# Patient Record
Sex: MALE | Race: WHITE | NOT HISPANIC OR LATINO | Employment: FULL TIME | URBAN - METROPOLITAN AREA
[De-identification: names, ages, dates, MRNs, and addresses within clinical notes are randomized per-mention and may not be internally consistent; named-entity substitution may affect disease eponyms.]

---

## 2017-10-04 ENCOUNTER — HOSPITAL ENCOUNTER (OUTPATIENT)
Dept: RADIOLOGY | Facility: HOSPITAL | Age: 57
Discharge: HOME | End: 2017-10-04
Payer: COMMERCIAL

## 2017-10-04 ENCOUNTER — TRANSCRIBE ORDERS (OUTPATIENT)
Dept: LAB | Facility: CLINIC | Age: 57
End: 2017-10-04

## 2017-10-04 ENCOUNTER — OFFICE VISIT (OUTPATIENT)
Dept: LAB | Facility: CLINIC | Age: 57
End: 2017-10-04
Payer: COMMERCIAL

## 2017-10-04 DIAGNOSIS — R05.9 COUGH: ICD-10-CM

## 2017-10-04 DIAGNOSIS — R07.89 CHEST DISCOMFORT: ICD-10-CM

## 2017-10-04 DIAGNOSIS — R07.89 CHEST DISCOMFORT: Primary | ICD-10-CM

## 2017-10-04 LAB
ATRIAL RATE: 59 BPM
P AXIS: 73 DEGREES
PR INTERVAL: 146 MS
QRS AXIS: 84 DEGREES
QRSD INTERVAL: 90 MS
QT INTERVAL: 408 MS
QTC INTERVAL: 403 MS
T WAVE AXIS: 61 DEGREES
VENTRICULAR RATE: 59 BPM

## 2017-10-04 PROCEDURE — 71020 HB CHEST X-RAY 2VW FRONTAL&LATL: CPT

## 2017-10-04 PROCEDURE — 93005 ELECTROCARDIOGRAM TRACING: CPT

## 2017-10-20 ENCOUNTER — TRANSCRIBE ORDERS (OUTPATIENT)
Dept: ADMINISTRATIVE | Facility: HOSPITAL | Age: 57
End: 2017-10-20

## 2017-10-20 DIAGNOSIS — R93.89 ABNORMAL CHEST X-RAY: Primary | ICD-10-CM

## 2017-10-26 ENCOUNTER — HOSPITAL ENCOUNTER (OUTPATIENT)
Dept: CT IMAGING | Facility: HOSPITAL | Age: 57
Discharge: HOME | End: 2017-10-26
Payer: COMMERCIAL

## 2017-10-26 DIAGNOSIS — R93.89 ABNORMAL CHEST X-RAY: ICD-10-CM

## 2017-10-26 PROCEDURE — 71260 CT THORAX DX C+: CPT

## 2017-10-26 RX ADMIN — IOHEXOL 85 ML: 350 INJECTION, SOLUTION INTRAVENOUS at 20:03

## 2019-04-25 ENCOUNTER — TELEPHONE (OUTPATIENT)
Dept: FAMILY MEDICINE CLINIC | Facility: CLINIC | Age: 59
End: 2019-04-25

## 2020-10-14 ENCOUNTER — OFFICE VISIT (OUTPATIENT)
Dept: INTERNAL MEDICINE CLINIC | Facility: CLINIC | Age: 60
End: 2020-10-14
Payer: COMMERCIAL

## 2020-10-14 VITALS
BODY MASS INDEX: 28.23 KG/M2 | HEART RATE: 72 BPM | TEMPERATURE: 98.1 F | OXYGEN SATURATION: 97 % | WEIGHT: 220 LBS | SYSTOLIC BLOOD PRESSURE: 108 MMHG | HEIGHT: 74 IN | DIASTOLIC BLOOD PRESSURE: 74 MMHG

## 2020-10-14 DIAGNOSIS — Z13.220 SCREENING FOR HYPERLIPIDEMIA: ICD-10-CM

## 2020-10-14 DIAGNOSIS — Z12.11 COLON CANCER SCREENING: ICD-10-CM

## 2020-10-14 DIAGNOSIS — Z87.448 HISTORY OF HEMATURIA: ICD-10-CM

## 2020-10-14 DIAGNOSIS — Z12.5 PROSTATE CANCER SCREENING: ICD-10-CM

## 2020-10-14 DIAGNOSIS — Z12.2 ENCOUNTER FOR SCREENING FOR LUNG CANCER: ICD-10-CM

## 2020-10-14 DIAGNOSIS — Z86.010 HISTORY OF COLON POLYPS: ICD-10-CM

## 2020-10-14 DIAGNOSIS — L98.9 SKIN LESION OF NECK: ICD-10-CM

## 2020-10-14 DIAGNOSIS — Z00.01 ENCOUNTER FOR GENERAL ADULT MEDICAL EXAMINATION WITH ABNORMAL FINDINGS: Primary | ICD-10-CM

## 2020-10-14 PROBLEM — Z86.0100 HISTORY OF COLON POLYPS: Status: ACTIVE | Noted: 2020-10-14

## 2020-10-14 PROCEDURE — 99386 PREV VISIT NEW AGE 40-64: CPT | Performed by: INTERNAL MEDICINE

## 2020-10-14 PROCEDURE — 3725F SCREEN DEPRESSION PERFORMED: CPT | Performed by: INTERNAL MEDICINE

## 2020-10-14 PROCEDURE — 1036F TOBACCO NON-USER: CPT | Performed by: INTERNAL MEDICINE

## 2020-11-04 ENCOUNTER — TELEPHONE (OUTPATIENT)
Dept: INTERNAL MEDICINE CLINIC | Facility: CLINIC | Age: 60
End: 2020-11-04

## 2020-11-17 ENCOUNTER — TELEPHONE (OUTPATIENT)
Dept: INTERNAL MEDICINE CLINIC | Facility: CLINIC | Age: 60
End: 2020-11-17

## 2020-11-18 DIAGNOSIS — N52.9 ERECTILE DYSFUNCTION, UNSPECIFIED ERECTILE DYSFUNCTION TYPE: Primary | ICD-10-CM

## 2020-11-18 RX ORDER — SILDENAFIL 25 MG/1
25 TABLET, FILM COATED ORAL DAILY PRN
Qty: 10 TABLET | Refills: 2 | Status: SHIPPED | OUTPATIENT
Start: 2020-11-18 | End: 2021-12-22 | Stop reason: DRUGHIGH

## 2020-11-30 ENCOUNTER — OFFICE VISIT (OUTPATIENT)
Dept: DERMATOLOGY | Facility: CLINIC | Age: 60
End: 2020-11-30
Payer: COMMERCIAL

## 2020-11-30 DIAGNOSIS — D48.5 NEOPLASM OF UNCERTAIN BEHAVIOR OF SKIN: Primary | ICD-10-CM

## 2020-11-30 PROCEDURE — 88305 TISSUE EXAM BY PATHOLOGIST: CPT | Performed by: STUDENT IN AN ORGANIZED HEALTH CARE EDUCATION/TRAINING PROGRAM

## 2020-11-30 PROCEDURE — 11102 TANGNTL BX SKIN SINGLE LES: CPT | Performed by: DERMATOLOGY

## 2020-11-30 PROCEDURE — 99202 OFFICE O/P NEW SF 15 MIN: CPT | Performed by: DERMATOLOGY

## 2020-12-01 DIAGNOSIS — N52.9 ERECTILE DYSFUNCTION, UNSPECIFIED ERECTILE DYSFUNCTION TYPE: Primary | ICD-10-CM

## 2020-12-01 RX ORDER — SILDENAFIL 50 MG/1
50 TABLET, FILM COATED ORAL DAILY PRN
Qty: 10 TABLET | Refills: 0 | Status: SHIPPED | OUTPATIENT
Start: 2020-12-01 | End: 2021-12-22 | Stop reason: SDUPTHER

## 2020-12-26 ENCOUNTER — PREP FOR PROCEDURE (OUTPATIENT)
Dept: GASTROENTEROLOGY | Facility: MEDICAL CENTER | Age: 60
End: 2020-12-26

## 2020-12-26 DIAGNOSIS — Z86.010 HX OF COLONIC POLYPS: Primary | ICD-10-CM

## 2021-05-19 ENCOUNTER — TELEPHONE (OUTPATIENT)
Dept: GASTROENTEROLOGY | Facility: CLINIC | Age: 61
End: 2021-05-19

## 2021-05-19 NOTE — TELEPHONE ENCOUNTER
Pt is due for recall colon for hx of polyps with Dr Manoj Chopra   Pt was scheduled, however, cxd due to wanting post covid  I called and spoke to pt whom informed he is still not ready and will call us when is to schedule, possibly summer  comfortable appearance/cooperative/smiling/interactive Recommended modifications/Nutrition relationship to health/disease/Provided nutritional counseling/educational material

## 2021-12-22 DIAGNOSIS — N52.9 ERECTILE DYSFUNCTION, UNSPECIFIED ERECTILE DYSFUNCTION TYPE: ICD-10-CM

## 2021-12-22 RX ORDER — SILDENAFIL 50 MG/1
50 TABLET, FILM COATED ORAL DAILY PRN
Qty: 10 TABLET | Refills: 0 | Status: SHIPPED | OUTPATIENT
Start: 2021-12-22 | End: 2022-06-27 | Stop reason: SDUPTHER

## 2022-06-14 ENCOUNTER — VBI (OUTPATIENT)
Dept: ADMINISTRATIVE | Facility: OTHER | Age: 62
End: 2022-06-14

## 2022-06-17 DIAGNOSIS — N52.9 ERECTILE DYSFUNCTION, UNSPECIFIED ERECTILE DYSFUNCTION TYPE: ICD-10-CM

## 2022-06-17 RX ORDER — SILDENAFIL 50 MG/1
50 TABLET, FILM COATED ORAL DAILY PRN
Qty: 10 TABLET | Refills: 0 | OUTPATIENT
Start: 2022-06-17

## 2022-06-17 NOTE — TELEPHONE ENCOUNTER
Sona De Paz has called the [facility] office requesting a refill of sildenafil (VIAGRA) 50 mg tablet  Pt requested refill to be sent to the [facility] on [address]

## 2022-06-27 ENCOUNTER — OFFICE VISIT (OUTPATIENT)
Dept: INTERNAL MEDICINE CLINIC | Facility: CLINIC | Age: 62
End: 2022-06-27
Payer: COMMERCIAL

## 2022-06-27 VITALS
WEIGHT: 223.4 LBS | OXYGEN SATURATION: 98 % | TEMPERATURE: 97.8 F | SYSTOLIC BLOOD PRESSURE: 124 MMHG | HEART RATE: 61 BPM | BODY MASS INDEX: 28.67 KG/M2 | DIASTOLIC BLOOD PRESSURE: 80 MMHG | HEIGHT: 74 IN

## 2022-06-27 DIAGNOSIS — Z00.00 ANNUAL PHYSICAL EXAM: Primary | ICD-10-CM

## 2022-06-27 DIAGNOSIS — Z13.6 SCREENING FOR CARDIOVASCULAR CONDITION: ICD-10-CM

## 2022-06-27 DIAGNOSIS — Z13.1 SCREENING FOR DIABETES MELLITUS: ICD-10-CM

## 2022-06-27 DIAGNOSIS — N52.9 ERECTILE DYSFUNCTION, UNSPECIFIED ERECTILE DYSFUNCTION TYPE: ICD-10-CM

## 2022-06-27 DIAGNOSIS — Z86.010 HISTORY OF COLON POLYPS: ICD-10-CM

## 2022-06-27 DIAGNOSIS — Z12.2 SCREENING FOR LUNG CANCER: ICD-10-CM

## 2022-06-27 DIAGNOSIS — Z11.59 NEED FOR HEPATITIS C SCREENING TEST: ICD-10-CM

## 2022-06-27 DIAGNOSIS — Z11.4 SCREENING FOR HIV (HUMAN IMMUNODEFICIENCY VIRUS): ICD-10-CM

## 2022-06-27 PROCEDURE — 3008F BODY MASS INDEX DOCD: CPT | Performed by: INTERNAL MEDICINE

## 2022-06-27 PROCEDURE — 3725F SCREEN DEPRESSION PERFORMED: CPT | Performed by: INTERNAL MEDICINE

## 2022-06-27 PROCEDURE — 1036F TOBACCO NON-USER: CPT | Performed by: INTERNAL MEDICINE

## 2022-06-27 PROCEDURE — 99396 PREV VISIT EST AGE 40-64: CPT | Performed by: INTERNAL MEDICINE

## 2022-06-27 RX ORDER — SILDENAFIL 50 MG/1
50 TABLET, FILM COATED ORAL DAILY PRN
Qty: 10 TABLET | Refills: 0 | Status: SHIPPED | OUTPATIENT
Start: 2022-06-27 | End: 2022-06-27

## 2022-06-27 RX ORDER — SILDENAFIL 50 MG/1
100 TABLET, FILM COATED ORAL DAILY PRN
Qty: 10 TABLET | Refills: 0 | Status: SHIPPED | OUTPATIENT
Start: 2022-06-27

## 2022-06-27 NOTE — PROGRESS NOTES
[facility] INTERNAL MEDICINE [place]    NAME: Dennis Wills  AGE: 63 y o  SEX: male  : 1960     DATE: 2022     Assessment and Plan:     Problem List Items Addressed This Visit    None     Visit Diagnoses     Annual physical exam    -  Primary    Screening for diabetes mellitus        Relevant Orders    Hemoglobin A1C    Screening for cardiovascular condition        Relevant Orders    Lipid panel    BMI 28 0-28 9,adult        Relevant Orders    Comprehensive metabolic panel    CBC and Platelet    TSH, 3rd generation with Free T4 reflex          Immunizations and preventive care screenings were discussed with patient today  Appropriate education was printed on patient's after visit summary  Counseling:  Alcohol/drug use: discussed moderation in alcohol intake, the recommendations for healthy alcohol use, and avoidance of illicit drug use  Dental Health: discussed importance of regular tooth brushing, flossing, and dental visits  Injury prevention: discussed safety/seat belts, safety helmets, smoke detectors, carbon dioxide detectors, and smoking near bedding or upholstery  Sexual health: discussed sexually transmitted diseases, partner selection, use of condoms, avoidance of unintended pregnancy, and contraceptive alternatives  · Exercise: the importance of regular exercise/physical activity was discussed  Recommend exercise 3-5 times per week for at least 30 minutes  Depression Screening and Follow-up Plan: Patient was screened for depression during today's encounter  They screened negative with a PHQ-2 score of 0  No follow-ups on file  Chief Complaint:     Chief Complaint   Patient presents with    Annual Exam      History of Present Illness:     Adult Annual Physical   Patient here for a comprehensive physical exam  The patient reports no problems    Last visit done in 2020 for annual physical, since then patient is doing well  Due to the pandemic he did not follow-up with CT lung screening or colonoscopy for history of polyps with his GI doctor, he is now willing to have the parent he also due for complete blood work parent he did have a lesion on his neck biopsy by dermatologist, with pathology revealing benign tissue  Today he has no subjective complaint  He does wish to increase his dose of sildenafil  His last urine and PSA was normal     Diet and Physical Activity  · Diet/Nutrition: well balanced diet, consuming 3-5 servings of fruits/vegetables daily and adequate whole grain intake  · Exercise: walking, moderate cardiovascular exercise and 3-4 times a week on average  Depression Screening  PHQ-2/9 Depression Screening    Little interest or pleasure in doing things: 0 - not at all  Feeling down, depressed, or hopeless: 0 - not at all  PHQ-2 Score: 0  PHQ-2 Interpretation: Negative depression screen       General Health  · Sleep: sleeps well, gets 7-8 hours of sleep on average and snores loudly  · Hearing: normal - bilateral   · Vision: wears glasses  · Dental: regular dental visits   Health  · Symptoms include: erectile dysfunction     Review of Systems:     Review of Systems   Constitutional: Negative for appetite change and fatigue  HENT: Negative for congestion and sore throat  Eyes: Negative for visual disturbance  Respiratory: Negative for cough, chest tightness, shortness of breath and wheezing  Cardiovascular: Negative for chest pain, palpitations and leg swelling  Gastrointestinal: Negative for abdominal pain, nausea and vomiting  Genitourinary: Negative for difficulty urinating, frequency, hematuria and urgency  Musculoskeletal: Negative for arthralgias and joint swelling  Skin: Negative for rash  Neurological: Negative for dizziness and headaches  Psychiatric/Behavioral: Negative for confusion and sleep disturbance        Past Medical History:     History reviewed  No pertinent past medical history  Past Surgical History:     Past Surgical History:   Procedure Laterality Date    TONSILECTOMY AND ADNOIDECTOMY        Family History:     Family History   Adopted: Yes      Social History:     Social History     Socioeconomic History    Marital status: /Civil Union     Spouse name: None    Number of children: None    Years of education: None    Highest education level: None   Occupational History    None   Tobacco Use    Smoking status: Former Smoker     Packs/day: 1 00     Years: 32 00     Pack years: 32 00     Quit date: 2020     Years since quittin 9    Smokeless tobacco: Never Used   Vaping Use    Vaping Use: Never used   Substance and Sexual Activity    Alcohol use: Not Currently    Drug use: Yes     Types: Marijuana     Comment: teenager    Sexual activity: None   Other Topics Concern    None   Social History Narrative    None     Social Determinants of Health     Financial Resource Strain: Not on file   Food Insecurity: Not on file   Transportation Needs: Not on file   Physical Activity: Not on file   Stress: Not on file   Social Connections: Not on file   Intimate Partner Violence: Not on file   Housing Stability: Not on file      Current Medications:     Current Outpatient Medications   Medication Sig Dispense Refill    sildenafil (VIAGRA) 50 MG tablet Take 1 tablet (50 mg total) by mouth daily as needed for erectile dysfunction (Patient not taking: Reported on 2022) 10 tablet 0     No current facility-administered medications for this visit  Allergies:     No Known Allergies   Physical Exam:     /80 (BP Location: Left arm, Patient Position: Sitting, Cuff Size: Standard)   Pulse 61   Temp 97 8 °F (36 6 °C) (Tympanic)   Ht 6' 2" (1 88 m)   Wt 101 kg (223 lb 6 4 oz)   SpO2 98%   BMI 28 68 kg/m²     Physical Exam  Vitals and nursing note reviewed  Constitutional:       General: He is not in acute distress  Appearance: Normal appearance  HENT:      Head: Normocephalic and atraumatic  Right Ear: Tympanic membrane and external ear normal       Left Ear: Tympanic membrane and external ear normal    Eyes:      Extraocular Movements: Extraocular movements intact  Pupils: Pupils are equal, round, and reactive to light  Cardiovascular:      Rate and Rhythm: Normal rate and regular rhythm  Pulses: Normal pulses  Heart sounds: No murmur heard  Pulmonary:      Effort: Pulmonary effort is normal       Breath sounds: Normal breath sounds  No wheezing or rhonchi  Abdominal:      General: Abdomen is flat  Bowel sounds are normal       Palpations: Abdomen is soft  Tenderness: There is no abdominal tenderness  Musculoskeletal:         General: No swelling  Normal range of motion  Cervical back: Normal range of motion and neck supple  Skin:     General: Skin is warm and dry  Capillary Refill: Capillary refill takes less than 2 seconds  Findings: No lesion or rash  Neurological:      General: No focal deficit present  Mental Status: He is alert and oriented to person, place, and time  Sensory: No sensory deficit  Motor: No weakness  Psychiatric:         Mood and Affect: Mood normal          Behavior: Behavior normal          Thought Content: Thought content normal          Judgment: Judgment normal           Bandar Izaguirre MD  [facility] INTERNAL MEDICINE [place]       BMI Counseling: Body mass index is 28 68 kg/m²  The BMI is above normal  Nutrition recommendations include moderation in carbohydrate intake  Exercise recommendations include exercising 3-5 times per week

## 2022-06-27 NOTE — PATIENT INSTRUCTIONS
Wellness Visit for Adults   AMBULATORY CARE:   A wellness visit  is when you see your healthcare provider to get screened for health problems  Your healthcare provider will also give you advice on how to stay healthy  Write down your questions so you remember to ask them  Ask your healthcare provider how often you should have a wellness visit  What happens at a wellness visit:  Your healthcare provider will ask about your health, and your family history of health problems  This includes high blood pressure, heart disease, and cancer  He or she will ask if you have symptoms that concern you, if you smoke, and about your mood  You may also be asked about your intake of medicines, supplements, food, and alcohol  Any of the following may be done:  · Your weight  will be checked  Your height may also be checked so your body mass index (BMI) can be calculated  Your BMI shows if you are at a healthy weight  · Your blood pressure  and heart rate will be checked  Your temperature may also be checked  · Blood and urine tests  may be done  Blood tests may be done to check your cholesterol levels  Abnormal cholesterol levels increase your risk for heart disease and stroke  You may also need a blood or urine test to check for diabetes if you are at increased risk  Urine tests may be done to look for signs of an infection or kidney disease  · A physical exam  includes checking your heartbeat and lungs with a stethoscope  Your healthcare provider may also check your skin to look for sun damage  · Screening tests  may be recommended  A screening test is done to check for diseases that may not cause symptoms  The screening tests you may need depend on your age, gender, family history, and lifestyle habits  For example, colorectal screening may be recommended if you are 48years old or older  Screening tests you need if you are a woman:   · A Pap smear  is used to screen for cervical cancer   Pap smears are usually done every 3 to 5 years depending on your age  You may need them more often if you have had abnormal Pap smear test results in the past  Ask your healthcare provider how often you should have a Pap smear  · A mammogram  is an x-ray of your breasts to screen for breast cancer  Experts recommend mammograms every 2 years starting at age 48 years  You may need a mammogram at age 52 years or younger if you have an increased risk for breast cancer  Talk to your healthcare provider about when you should start having mammograms and how often you need them  Vaccines you may need:   · Get an influenza vaccine  every year  The influenza vaccine protects you from the flu  Several types of viruses cause the flu  The viruses change over time, so new vaccines are made each year  · Get a tetanus-diphtheria (Td) booster vaccine  every 10 years  This vaccine protects you against tetanus and diphtheria  Tetanus is a severe infection that may cause painful muscle spasms and lockjaw  Diphtheria is a severe bacterial infection that causes a thick covering in the back of your mouth and throat  · Get a human papillomavirus (HPV) vaccine  if you are female and aged 23 to 32 or male 23 to 24 and never received it  This vaccine protects you from HPV infection  HPV is the most common infection spread by sexual contact  HPV may also cause vaginal, penile, and anal cancers  · Get a pneumococcal vaccine  if you are aged 72 years or older  The pneumococcal vaccine is an injection given to protect you from pneumococcal disease  Pneumococcal disease is an infection caused by pneumococcal bacteria  The infection may cause pneumonia, meningitis, or an ear infection  · Get a shingles vaccine  if you are 60 or older, even if you have had shingles before  The shingles vaccine is an injection to protect you from the varicella-zoster virus  This is the same virus that causes chickenpox   Shingles is a painful rash that develops in people who had chickenpox or have been exposed to the virus  How to eat healthy:  My Plate is a model for planning healthy meals  It shows the types and amounts of foods that should go on your plate  Fruits and vegetables make up about half of your plate, and grains and protein make up the other half  A serving of dairy is included on the side of your plate  The amount of calories and serving sizes you need depends on your age, gender, weight, and height  Examples of healthy foods are listed below:  · Eat a variety of vegetables  such as dark green, red, and orange vegetables  You can also include canned vegetables low in sodium (salt) and frozen vegetables without added butter or sauces  · Eat a variety of fresh fruits , canned fruit in 100% juice, frozen fruit, and dried fruit  · Include whole grains  At least half of the grains you eat should be whole grains  Examples include whole-wheat bread, wheat pasta, brown rice, and whole-grain cereals such as oatmeal     · Eat a variety of protein foods such as seafood (fish and shellfish), lean meat, and poultry without skin (turkey and chicken)  Examples of lean meats include pork leg, shoulder, or tenderloin, and beef round, sirloin, tenderloin, and extra lean ground beef  Other protein foods include eggs and egg substitutes, beans, peas, soy products, nuts, and seeds  · Choose low-fat dairy products such as skim or 1% milk or low-fat yogurt, cheese, and cottage cheese  · Limit unhealthy fats  such as butter, hard margarine, and shortening  Exercise:  Exercise at least 30 minutes per day on most days of the week  Some examples of exercise include walking, biking, dancing, and swimming  You can also fit in more physical activity by taking the stairs instead of the elevator or parking farther away from stores  Include muscle strengthening activities 2 days each week  Regular exercise provides many health benefits   It helps you manage your weight, and decreases your risk for type 2 diabetes, heart disease, stroke, and high blood pressure  Exercise can also help improve your mood  Ask your healthcare provider about the best exercise plan for you  General health and safety guidelines:   · Do not smoke  Nicotine and other chemicals in cigarettes and cigars can cause lung damage  Ask your healthcare provider for information if you currently smoke and need help to quit  E-cigarettes or smokeless tobacco still contain nicotine  Talk to your healthcare provider before you use these products  · Limit alcohol  A drink of alcohol is 12 ounces of beer, 5 ounces of wine, or 1½ ounces of liquor  · Lose weight, if needed  Being overweight increases your risk of certain health conditions  These include heart disease, high blood pressure, type 2 diabetes, and certain types of cancer  · Protect your skin  Do not sunbathe or use tanning beds  Use sunscreen with a SPF 15 or higher  Apply sunscreen at least 15 minutes before you go outside  Reapply sunscreen every 2 hours  Wear protective clothing, hats, and sunglasses when you are outside  · Drive safely  Always wear your seatbelt  Make sure everyone in your car wears a seatbelt  A seatbelt can save your life if you are in an accident  Do not use your cell phone when you are driving  This could distract you and cause an accident  Pull over if you need to make a call or send a text message  · Practice safe sex  Use latex condoms if are sexually active and have more than one partner  Your healthcare provider may recommend screening tests for sexually transmitted infections (STIs)  · Wear helmets, lifejackets, and protective gear  Always wear a helmet when you ride a bike or motorcycle, go skiing, or play sports that could cause a head injury  Wear protective equipment when you play sports  Wear a lifejacket when you are on a boat or doing water sports      © Copyright [employer] 2022 Information is for End User's use only and may not be sold, redistributed or otherwise used for commercial purposes  All illustrations and images included in CareNotes® are the copyrighted property of A D A M [employer] Inc  or [employer] Alberto Gambino  The above information is an  only  It is not intended as medical advice for individual conditions or treatments  Talk to your doctor, nurse or pharmacist before following any medical regimen to see if it is safe and effective for you  Low Fat Diet   AMBULATORY CARE:   A low-fat diet  is an eating plan that is low in total fat, unhealthy fat, and cholesterol  You may need to follow a low-fat diet if you have trouble digesting or absorbing fat  You may also need to follow this diet if you have high cholesterol  You can also lower your cholesterol by increasing the amount of fiber in your diet  Soluble fiber is a type of fiber that helps to decrease cholesterol levels  Different types of fat in food:   · Limit unhealthy fats  A diet that is high in cholesterol, saturated fat, and trans fat may cause unhealthy cholesterol levels  Unhealthy cholesterol levels increase your risk of heart disease  ? Cholesterol:  Limit intake of cholesterol to less than 200 mg per day  Cholesterol is found in meat, eggs, and dairy  ? Saturated fat:  Limit saturated fat to less than 7% of your total daily calories  Ask your dietitian how many calories you need each day  Saturated fat is found in butter, cheese, ice cream, whole milk, and palm oil  Saturated fat is also found in meat, such as beef, pork, chicken skin, and processed meats  Processed meats include sausage, hot dogs, and bologna  ? Trans fat:  Avoid trans fat as much as possible  Trans fat is used in fried and baked foods  Foods that say trans fat free on the label may still have up to 0 5 grams of trans fat per serving  · Include healthy fats    Replace foods that are high in saturated and trans fat with foods high in healthy fats  This may help to decrease high cholesterol levels  ? Monounsaturated fats: These are found in avocados, nuts, and vegetable oils, such as olive, canola, and sunflower oil  ? Polyunsaturated fats: These can be found in vegetable oils, such as soybean or corn oil  Omega-3 fats can help to decrease the risk of heart disease  Omega-3 fats are found in fish, such as salmon, herring, trout, and tuna  Omega-3 fats can also be found in plant foods, such as walnuts, flaxseed, soybeans, and canola oil  Foods to limit or avoid:   · Grains:      ? Snacks that are made with partially hydrogenated oils, such as chips, regular crackers, and butter-flavored popcorn    ? High-fat baked goods, such as biscuits, croissants, doughnuts, pies, cookies, and pastries    · Dairy:      ? Whole milk, 2% milk, and yogurt and ice cream made with whole milk    ? Half and half creamer, heavy cream, and whipping cream    ? Cheese, cream cheese, and sour cream    · Meats and proteins:      ? High-fat cuts of meat (T-bone steak, regular hamburger, and ribs)    ? Fried meat, poultry (turkey and chicken), and fish    ? Poultry (chicken and turkey) with skin    ? Cold cuts (salami or bologna), hot dogs, hernadez, and sausage    ? Whole eggs and egg yolks    · Vegetables and fruits with added fat:      ? Fried vegetables or vegetables in butter or high-fat sauces, such as cream or cheese sauces    ? Fried fruit or fruit served with butter or cream    · Fats:      ? Butter, stick margarine, and shortening    ? Coconut, palm oil, and palm kernel oil    Foods to include:   · Grains:      ? Whole-grain breads, cereals, pasta, and brown rice    ? Low-fat crackers and pretzels    · Vegetables and fruits:      ? Fresh, frozen, or canned vegetables (no salt or low-sodium)    ? Fresh, frozen, dried, or canned fruit (canned in light syrup or fruit juice)    ? Avocado    · Low-fat dairy products:      ? Nonfat (skim) or 1% milk    ?  Nonfat or low-fat cheese, yogurt, and cottage cheese    · Meats and proteins:      ? Chicken or turkey with no skin    ? Baked or broiled fish    ? Lean beef and pork (loin, round, extra lean hamburger)    ? Beans and peas, unsalted nuts, soy products    ? Egg whites and substitutes    ? Seeds and nuts    · Fats:      ? Unsaturated oil, such as canola, olive, peanut, soybean, or sunflower oil    ? Soft or liquid margarine and vegetable oil spread    ? Low-fat salad dressing    Other ways to decrease fat:   · Read food labels before you buy foods  Choose foods that have less than 30% of calories from fat  Choose low-fat or fat-free dairy products  Remember that fat free does not mean calorie free  These foods still contain calories, and too many calories can lead to weight gain  · Trim fat from meat and avoid fried food  Trim all visible fat from meat before you cook it  Remove the skin from poultry  Do not reyes meat, fish, or poultry  Bake, roast, boil, or broil these foods instead  Avoid fried foods  Eat a baked potato instead of [place] fries  Steam vegetables instead of sautéing them in butter  · Add less fat to foods  Use imitation hernadez bits on salads and baked potatoes instead of regular hernadez bits  Use fat-free or low-fat salad dressings instead of regular dressings  Use low-fat or nonfat butter-flavored topping instead of regular butter or margarine on popcorn and other foods  Ways to decrease fat in recipes:  Replace high-fat ingredients with low-fat or nonfat ones  This may cause baked goods to be drier than usual  You may need to use nonfat cooking spray on pans to prevent food from sticking  You also may need to change the amount of other ingredients, such as water, in the recipe  Try the following:  · Use low-fat or light margarine instead of regular margarine or shortening  · Use lean ground turkey breast or chicken, or lean ground beef (less than 5% fat) instead of hamburger       · Add 1 teaspoon of canola oil to 8 ounces of skim milk instead of using cream or half and half  · Use grated zucchini, carrots, or apples in breads instead of coconut  · Use blenderized, low-fat cottage cheese, plain tofu, or low-fat ricotta cheese instead of cream cheese  · Use 1 egg white and 1 teaspoon of canola oil, or use ¼ cup (2 ounces) of fat-free egg substitute instead of a whole egg  · Replace half of the oil that is called for in a recipe with applesauce when you bake  Use 3 tablespoons of cocoa powder and 1 tablespoon of canola oil instead of a square of baking chocolate  How to increase fiber:  Eat enough high-fiber foods to get 20 to 30 grams of fiber every day  Slowly increase your fiber intake to avoid stomach cramps, gas, and other problems  · Eat 3 ounces of whole-grain foods each day  An ounce is about 1 slice of bread  Eat whole-grain breads, such as whole-wheat bread  Whole wheat, whole-wheat flour, or other whole grains should be listed as the first ingredient on the food label  Replace white flour with whole-grain flour or use half of each in recipes  Whole-grain flour is heavier than white flour, so you may have to add more yeast or baking powder  · Eat a high-fiber cereal for breakfast   Oatmeal is a good source of soluble fiber  Look for cereals that have bran or fiber in the name  Choose whole-grain products, such as brown rice, barley, and whole-wheat pasta  · Eat more beans, peas, and lentils  For example, add beans to soups or salads  Eat at least 5 cups of fruits and vegetables each day  Eat fruits and vegetables with the peel because the peel is high in fiber  © Copyright [employer] 2022 Information is for End User's use only and may not be sold, redistributed or otherwise used for commercial purposes   All illustrations and images included in CareNotes® are the copyrighted property of [employer]  or [employer] Alberto Gambino  The above information is an  only  It is not intended as medical advice for individual conditions or treatments  Talk to your doctor, nurse or pharmacist before following any medical regimen to see if it is safe and effective for you

## 2022-06-29 LAB
EXTERNAL HIV SCREEN: NORMAL
HBA1C MFR BLD HPLC: 5.2 %
HCV AB SER-ACNC: NEGATIVE

## 2022-07-07 ENCOUNTER — TELEPHONE (OUTPATIENT)
Dept: GASTROENTEROLOGY | Facility: CLINIC | Age: 62
End: 2022-07-07

## 2022-07-07 NOTE — TELEPHONE ENCOUNTER
[facility] received referral from PCP  Please call to schedule an o/v & or Colonoscopy  Will follow up in 1 week

## 2022-09-16 ENCOUNTER — VBI (OUTPATIENT)
Dept: ADMINISTRATIVE | Facility: OTHER | Age: 62
End: 2022-09-16

## 2022-11-01 ENCOUNTER — OFFICE VISIT (OUTPATIENT)
Dept: INTERNAL MEDICINE CLINIC | Facility: CLINIC | Age: 62
End: 2022-11-01

## 2022-11-01 VITALS
OXYGEN SATURATION: 99 % | TEMPERATURE: 98.6 F | SYSTOLIC BLOOD PRESSURE: 136 MMHG | WEIGHT: 222.8 LBS | BODY MASS INDEX: 28.59 KG/M2 | HEART RATE: 54 BPM | HEIGHT: 74 IN | DIASTOLIC BLOOD PRESSURE: 80 MMHG

## 2022-11-01 DIAGNOSIS — R36.9 PENILE DISCHARGE: ICD-10-CM

## 2022-11-01 DIAGNOSIS — R30.0 DYSURIA: Primary | ICD-10-CM

## 2022-11-01 PROBLEM — L98.9 SKIN LESION OF NECK: Status: RESOLVED | Noted: 2020-10-14 | Resolved: 2022-11-01

## 2022-11-01 LAB
SL AMB  POCT GLUCOSE, UA: ABNORMAL
SL AMB LEUKOCYTE ESTERASE,UA: ABNORMAL
SL AMB POCT BILIRUBIN,UA: ABNORMAL
SL AMB POCT BLOOD,UA: ABNORMAL
SL AMB POCT CLARITY,UA: CLEAR
SL AMB POCT COLOR,UA: ABNORMAL
SL AMB POCT KETONES,UA: ABNORMAL
SL AMB POCT NITRITE,UA: ABNORMAL
SL AMB POCT PH,UA: 5
SL AMB POCT SPECIFIC GRAVITY,UA: 1.02
SL AMB POCT URINE PROTEIN: 15
SL AMB POCT UROBILINOGEN: ABNORMAL

## 2022-11-01 RX ORDER — DOXYCYCLINE HYCLATE 100 MG/1
100 CAPSULE ORAL EVERY 12 HOURS SCHEDULED
Qty: 14 CAPSULE | Refills: 0 | Status: SHIPPED | OUTPATIENT
Start: 2022-11-01 | End: 2022-11-08

## 2022-11-01 RX ORDER — CEFIXIME 400 MG/1
800 CAPSULE ORAL ONCE
Qty: 2 CAPSULE | Refills: 0 | Status: SHIPPED | OUTPATIENT
Start: 2022-11-01 | End: 2022-11-01

## 2022-11-01 NOTE — PATIENT INSTRUCTIONS
Safe Sex Practices   AMBULATORY CARE:   Safe sex practices  are ways to prevent pregnancy and the spread of sexually transmitted infections (STIs)  An STI happens when a virus or bacteria are spread through sexual activity  Safe sex practices help decrease or prevent body fluid exchange during sex  Body fluids include saliva, urine, blood, vaginal fluids, and semen  Oral, vaginal, and anal sex can all spread STIs  Seek care immediately if:   A condom breaks, leaks, or slips off while you are having sex  You notice sores on your penis, vagina, anal area, or skin around them  You have had unsafe sex and want to discuss emergency contraception or treatment for STI exposure  Call your doctor if:   You or your female sex partner might be pregnant  You have questions or concerns about your condition or care  Safe sex practices to follow before you have sex:   Talk to a new partner before you have sex  Tell your partner if you have an STI  Ask about his or her sex history and if he or she has a current or past STI  Your partner may need to be tested and treated  Do not have sex while you are being treated for an STI, or with a partner who is being treated  Limit your number of sex partners  More than one sex partner can increase your risk for an STI  Do not have sex with anyone whose sex history you do not know  Get tested for STIs if needed  Get tested if you had sex with someone who has an STI  Get tested if you have unprotected sex with any new partner  Talk to your healthcare provider about birth control  Birth control can help prevent an unwanted pregnancy  There are many different types of birth control  Talk to your healthcare provider about which birth control method is right for you  Ask about medicines to lower your risk for some STIs:      Vaccines  can help protect you from hepatitis A, hepatitis B, and the human papillomavirus (HPV)   The HPV vaccine is usually given at 11 years, but it may be given through 26 years to both females and males  Your provider can give you more information on vaccines to prevent STIs  Pre-exposure prophylaxis (PrEP)  may be given if you are at high risk for HIV  PrEP is taken every day to prevent the virus from fully infecting the body  Do not use alcohol or drugs before sex  These can prevent you from thinking clearly and increase your risk for unsafe sex  Safe sex practices to follow while you are having sex:   Use condoms and barrier methods for all types of sexual contact  This includes oral, vaginal, and anal sex  Male and female condoms are available  Make sure that the condom fits and is put on correctly  Rubber latex sheets or dental dams can be used for oral sex  Use a new condom or latex barrier each time you have sex  Use latex condoms, if possible  Lambskin or natural condoms do not prevent STIs  If you or your partner is allergic to latex, use a nonlatex product, such as polyurethane  Use a second form of birth control with the condom to prevent pregnancy and STIs  Do not use male and female condoms together  Only use water-based lubricants during sex  Water-based lubricants help prevent sores or cuts in the vagina or on the penis  Prevent sores or cuts to decrease your risk for an STI  Do not use oil-based lubricants, such as baby oil or hand lotion, with latex condoms or barriers  These will weaken the latex and may cause the condom to break  Do not use chemicals that irritate your skin  Products that contain chemical irritants, such as spermicides, can irritate the lining of your vagina or rectum  Irritation may cause sores that can increase your risk for an STI  Be careful when you have sex if you have open sores or cuts  Open sores or cuts may increase your risk for an STI  Keep all open sores or cuts covered during sex  Do not have oral sex if you have cuts or sores in your mouth      Do not do activities that can pass germs  Do not use saliva as a lubricant or share sex toys  Follow up with your doctor as directed:  Write down your questions so you remember to ask them during your visits  © Copyright [employer] 2022 Information is for End User's use only and may not be sold, redistributed or otherwise used for commercial purposes  All illustrations and images included in CareNotes® are the copyrighted property of A D A M , Inc  or [facility] [employer] Starr   The above information is an  only  It is not intended as medical advice for individual conditions or treatments  Talk to your doctor, nurse or pharmacist before following any medical regimen to see if it is safe and effective for you

## 2022-11-01 NOTE — ASSESSMENT & PLAN NOTE
Associated with whitish penile discharge, will check urine dip but likely STI, will treat empirically

## 2022-11-01 NOTE — ASSESSMENT & PLAN NOTE
Will treat empirically for STI with Suprax and Doxy, urine test done will call with results  Oriented safe sex with condom use

## 2022-11-02 ENCOUNTER — TELEPHONE (OUTPATIENT)
Dept: LAB | Facility: HOSPITAL | Age: 62
End: 2022-11-02

## 2022-11-03 ENCOUNTER — TELEPHONE (OUTPATIENT)
Dept: INTERNAL MEDICINE CLINIC | Facility: CLINIC | Age: 62
End: 2022-11-03

## 2022-11-03 NOTE — TELEPHONE ENCOUNTER
----- Message from Mehreen Edwards sent at 11/1/2022 10:58 PM EDT -----  Regarding: Cancellation of Order # [id number]  Order number [id number] for the procedure CHLAMYDIA /GC AMPLIFIED   DNA [[id number]] has been canceled by Mehreen Edwards [[id number]]  This   procedure was ordered by you on Nov 1, 2022 for the patient   Steven Sharpe [[id number]]  The reason for cancellation was "Test   not performed  Urine volume was not within the black fill lines   on the Celeste urine collection tube  Please recollect if   appropriate "  Called patient and inform the test could not be done, his symptoms are improving, no need for recollection as the results will no be accurate  Asked patient to call if new symptoms arise after completing the treatment

## 2023-01-13 ENCOUNTER — OFFICE VISIT (OUTPATIENT)
Dept: INTERNAL MEDICINE CLINIC | Facility: CLINIC | Age: 63
End: 2023-01-13

## 2023-01-13 VITALS
HEIGHT: 74 IN | TEMPERATURE: 98.8 F | HEART RATE: 59 BPM | SYSTOLIC BLOOD PRESSURE: 120 MMHG | WEIGHT: 227.4 LBS | BODY MASS INDEX: 29.18 KG/M2 | OXYGEN SATURATION: 98 % | DIASTOLIC BLOOD PRESSURE: 82 MMHG

## 2023-01-13 DIAGNOSIS — J02.0 STREP PHARYNGITIS: Primary | ICD-10-CM

## 2023-01-13 RX ORDER — AZITHROMYCIN 250 MG/1
TABLET, FILM COATED ORAL
COMMUNITY
Start: 2023-01-10 | End: 2023-01-13

## 2023-01-13 RX ORDER — VIT C/B6/B5/MAGNESIUM/HERB 173 50-5-6-5MG
CAPSULE ORAL
COMMUNITY

## 2023-01-13 RX ORDER — ZINC GLUCONATE 50 MG
50 TABLET ORAL DAILY
COMMUNITY

## 2023-01-13 RX ORDER — AMOXICILLIN 500 MG/1
500 CAPSULE ORAL EVERY 8 HOURS SCHEDULED
Qty: 21 CAPSULE | Refills: 0 | Status: SHIPPED | OUTPATIENT
Start: 2023-01-13 | End: 2023-01-20

## 2023-01-13 NOTE — ASSESSMENT & PLAN NOTE
· 9-10 days of sore throat, mild cough without production, swollen glands under his jaw  · Has been taking a Z-Ld from urgent care without relief for the last 3 days  · Erythema and exudate in the oropharynx on exam  · Will give prescription for amoxicillin for strep pharyngitis  · Recommended honey and salt water gargles for symptom relief

## 2023-01-13 NOTE — PROGRESS NOTES
Name: Ruth Be      : 1960      MRN: [id number]  Encounter Provider: Adolfo Sarkar DO  Encounter Date: 2023   Encounter department: [facility] INTERNAL MEDICINE [place]    Assessment & Plan     1  Strep pharyngitis  Assessment & Plan:  · 9-10 days of sore throat, mild cough without production, swollen glands under his jaw  · Has been taking a Z-Ld from urgent care without relief for the last 3 days  · Erythema and exudate in the oropharynx on exam  · Will give prescription for amoxicillin for strep pharyngitis  · Recommended honey and salt water gargles for symptom relief    Orders:  -     amoxicillin (AMOXIL) 500 mg capsule; Take 1 capsule (500 mg total) by mouth every 8 (eight) hours for 7 days         Subjective      Ruth Be is a 59-year-old male presenting today for sick visit  He reports last 9 to 10 days he has been having difficulty swallowing due to sore throat  Hurts now even when he swallows his own saliva or liquids  He noticed the last couple days he has been having swollen glands under his jaw  He went to urgent care 4 days ago and was given a prescription for a Z-Ld but his symptoms have not improved  He does endorse a mild cough worse at night and after showering but is not productive  Denies any changes in appetite, fever, chills, nausea, diarrhea, myalgias  He is sexually active with women sometimes with oral sex  He is a former smoker, quit 3 years ago  Does not drink alcohol  Review of Systems   Constitutional: Negative for activity change, appetite change, chills, fatigue and fever  HENT: Positive for sore throat and trouble swallowing  Negative for congestion, ear pain, postnasal drip, rhinorrhea, sinus pressure, sinus pain and tinnitus  Respiratory: Positive for cough  Negative for shortness of breath  Cardiovascular: Negative for chest pain  Gastrointestinal: Negative for diarrhea and nausea     All other systems reviewed and are negative  Current Outpatient Medications on File Prior to Visit   Medication Sig   • Turmeric 500 MG CAPS Take by mouth   • zinc gluconate 50 mg tablet Take 50 mg by mouth daily   • [DISCONTINUED] azithromycin (ZITHROMAX) 250 mg tablet  (Patient not taking: Reported on 1/13/2023)   • [DISCONTINUED] sildenafil (VIAGRA) 50 MG tablet Take 2 tablets (100 mg total) by mouth daily as needed for erectile dysfunction (Patient not taking: Reported on 11/1/2022)       Objective     /82 (BP Location: Left arm, Patient Position: Sitting, Cuff Size: Standard)   Pulse 59   Temp 98 8 °F (37 1 °C) (Tympanic)   Ht 6' 2" (1 88 m)   Wt 103 kg (227 lb 6 4 oz)   SpO2 98%   BMI 29 20 kg/m²     Physical Exam  Constitutional:       General: He is not in acute distress  Appearance: He is normal weight  He is not ill-appearing  HENT:      Head: Normocephalic and atraumatic  Right Ear: Tympanic membrane, ear canal and external ear normal       Left Ear: Tympanic membrane, ear canal and external ear normal       Nose: Nose normal  No congestion or rhinorrhea  Comments: No maxillary or frontal sinus tenderness     Mouth/Throat:      Mouth: Mucous membranes are moist       Pharynx: Oropharyngeal exudate and posterior oropharyngeal erythema present  Eyes:      Extraocular Movements: Extraocular movements intact  Cardiovascular:      Rate and Rhythm: Normal rate and regular rhythm  Heart sounds: No murmur heard  Pulmonary:      Effort: Pulmonary effort is normal  No respiratory distress  Breath sounds: Normal breath sounds  No wheezing  Lymphadenopathy:      Cervical: Cervical adenopathy present  Skin:     General: Skin is warm and dry  Neurological:      Mental Status: He is alert and oriented to person, place, and time  Mental status is at baseline     Psychiatric:         Mood and Affect: Mood normal          Behavior: Behavior normal        Anice Geeta, DO

## 2024-02-21 PROBLEM — Z12.5 PROSTATE CANCER SCREENING: Status: RESOLVED | Noted: 2020-10-14 | Resolved: 2024-02-21

## 2024-02-23 ENCOUNTER — OFFICE VISIT (OUTPATIENT)
Dept: INTERNAL MEDICINE CLINIC | Facility: CLINIC | Age: 64
End: 2024-02-23
Payer: COMMERCIAL

## 2024-02-23 VITALS
TEMPERATURE: 97.6 F | DIASTOLIC BLOOD PRESSURE: 82 MMHG | HEIGHT: 74 IN | OXYGEN SATURATION: 97 % | RESPIRATION RATE: 14 BRPM | SYSTOLIC BLOOD PRESSURE: 120 MMHG | WEIGHT: 232 LBS | HEART RATE: 62 BPM | BODY MASS INDEX: 29.77 KG/M2

## 2024-02-23 DIAGNOSIS — Z01.00 ENCOUNTER FOR ROUTINE EYE AND VISION EXAMINATION: ICD-10-CM

## 2024-02-23 DIAGNOSIS — Z00.00 ANNUAL PHYSICAL EXAM: Primary | ICD-10-CM

## 2024-02-23 PROBLEM — Z00.01 ENCOUNTER FOR GENERAL ADULT MEDICAL EXAMINATION WITH ABNORMAL FINDINGS: Status: RESOLVED | Noted: 2020-10-14 | Resolved: 2024-02-23

## 2024-02-23 PROBLEM — R36.9 PENILE DISCHARGE: Status: RESOLVED | Noted: 2022-11-01 | Resolved: 2024-02-23

## 2024-02-23 PROBLEM — J02.0 STREP PHARYNGITIS: Status: RESOLVED | Noted: 2023-01-13 | Resolved: 2024-02-23

## 2024-02-23 PROBLEM — R30.0 DYSURIA: Status: RESOLVED | Noted: 2022-11-01 | Resolved: 2024-02-23

## 2024-02-23 PROCEDURE — 99396 PREV VISIT EST AGE 40-64: CPT | Performed by: STUDENT IN AN ORGANIZED HEALTH CARE EDUCATION/TRAINING PROGRAM

## 2024-02-23 NOTE — PATIENT INSTRUCTIONS
Proceed with blood work: CBC, CMP, lipid panel, TSH, Hgb A1C, UA  Proceed with PSA AG for prostate cancer screening ordered, it's a blood test, will be obtained with other blood work  Pt was encouraged to proceed with low dose CT lung screen (ordered on previous visit), please call to make an appt  Referral to GI for colonoscopy was provided, pt agreeable to proceed, please call provider to make an appt  Referral to Ophthalmology provided, please call provider to make an appt  If you have any difficulties scheduling an appt please reach out to out laura and they will schedule it for you  For weight control try to follow Mediterranean diet, dash diet, intermittent fasting  Proceed with regular moderate intensity exercise

## 2024-02-23 NOTE — ASSESSMENT & PLAN NOTE
Proceed with blood work was ordered: CBC, CMP, lipid panel, TSH, Hgb A1C, UA  PSA AG for prostate cancer screening ordered, pt agreeable to proceed  Pt was encouraged to proceed with low dose CT lung screen (ordered on previous visit)  Referral to GI for colonoscopy was provided, pt agreeable to proceed  Referral to Ophthalmology provided

## 2024-02-23 NOTE — PROGRESS NOTES
ADULT ANNUAL PHYSICAL  [facility] INTERNAL MEDICINE [place]    NAME: Carrington Quintanilla  AGE: 63 y.o. SEX: male  : 1960     DATE: 2024    Last clinic visit on 23 for pharyngitis managed with amoxicillin with resolution.  No recent labs.    Most recent colonoscopy in  showed small polyps and internal hemorrhoids and it was recommended to repeat in 5 years.    Last CT chest in  was significant for Biapical patchy nodular density with associated paraseptal and centrilobular emphysema which most likely represents pleural parenchymal scarring. Given lung ca risk surveillance is recommended.     Assessment and Plan:     Problem List Items Addressed This Visit       Annual physical exam - Primary     Proceed with blood work was ordered: CBC, CMP, lipid panel, TSH, Hgb A1C, UA  PSA AG for prostate cancer screening ordered, pt agreeable to proceed  Pt was encouraged to proceed with low dose CT lung screen (ordered on previous visit)  Referral to GI for colonoscopy was provided, pt agreeable to proceed  Referral to Ophthalmology provided         Relevant Orders    CBC and differential    Comprehensive metabolic panel    Lipid panel    Hemoglobin A1C    TSH, 3rd generation with Free T4 reflex    PSA Total (Reflex To Free)    Ambulatory Referral to Ophthalmology    UA w Reflex to Microscopic w Reflex to Culture    Ambulatory Referral to Gastroenterology    PSA Total (Reflex To Free)       Immunizations and preventive care screenings were discussed with patient today. Appropriate education was printed on patient's after visit summary.    Discussed risks and benefits of prostate cancer screening. We discussed the controversial history of PSA screening for prostate cancer in the [place] as well as the risk of over detection and over treatment of prostate cancer by way of PSA screening.  The patient understands that PSA blood testing is an imperfect way to screen for  prostate cancer and that elevated PSA levels in the blood may also be caused by infection, inflammation, prostatic trauma or manipulation, urological procedures, or by benign prostatic enlargement.    The role of the digital rectal examination in prostate cancer screening was also discussed and I discussed with him that there is large interobserver variability in the findings of digital rectal examination.    Counseling:  Alcohol/drug use: discussed moderation in alcohol intake, the recommendations for healthy alcohol use, and avoidance of illicit drug use.  Dental Health: discussed importance of regular tooth brushing, flossing, and dental visits.  Injury prevention: discussed safety/seat belts, safety helmets, smoke detectors, carbon dioxide detectors, and smoking near bedding or upholstery.  Sexual health: discussed sexually transmitted diseases, partner selection, use of condoms, avoidance of unintended pregnancy, and contraceptive alternatives.  Exercise: the importance of regular exercise/physical activity was discussed. Recommend exercise 3-5 times per week for at least 30 minutes.          Return in 1 year (on 2/23/2025), or if symptoms worsen or fail to improve.     Chief Complaint:     Chief Complaint   Patient presents with    Annual Exam      History of Present Illness:     Adult Annual Physical   Patient here for a comprehensive physical exam. The patient reports no problems.    Diet and Physical Activity  Diet/Nutrition: well balanced diet.   Exercise: 3-4 times a week on average.      Depression Screening  PHQ-2/9 Depression Screening    Little interest or pleasure in doing things: 0 - not at all  Feeling down, depressed, or hopeless: 0 - not at all  PHQ-2 Score: 0  PHQ-2 Interpretation: Negative depression screen       General Health  Sleep: gets 7-8 hours of sleep on average.   Hearing: decreased - left.   Vision: vision problems: decreased acuity .   Dental: regular dental visits.         Health  Symptoms include: none    Advanced Care Planning  Do you have an advanced directive? yes  Do you have a durable medical power of ? no  ACP document given to patient? no     Review of Systems:     Review of Systems   Constitutional:  Negative for chills and fever.   HENT:  Negative for ear pain and sore throat.    Eyes:  Negative for pain and visual disturbance.   Respiratory:  Negative for cough and shortness of breath.    Cardiovascular:  Negative for chest pain and palpitations.   Gastrointestinal:  Negative for abdominal pain and vomiting.   Genitourinary:  Negative for dysuria and hematuria.   Musculoskeletal:  Negative for arthralgias and back pain.   Skin:  Negative for color change and rash.   Neurological:  Negative for seizures and syncope.   All other systems reviewed and are negative.     Past Medical History:     Past Medical History:   Diagnosis Date    Skin lesion of neck 10/14/2020      Past Surgical History:     Past Surgical History:   Procedure Laterality Date    TONSILECTOMY AND ADNOIDECTOMY        Family History:     Family History   Adopted: Yes      Social History:     Social History     Socioeconomic History    Marital status: /Civil Union     Spouse name: None    Number of children: None    Years of education: None    Highest education level: None   Occupational History    None   Tobacco Use    Smoking status: Former     Current packs/day: 0.00     Average packs/day: 1 pack/day for 32.0 years (32.0 ttl pk-yrs)     Types: Cigarettes     Start date: 8/1/1988     Quit date: 8/1/2020     Years since quitting: 3.5    Smokeless tobacco: Never   Vaping Use    Vaping status: Never Used   Substance and Sexual Activity    Alcohol use: Not Currently    Drug use: Yes     Types: Marijuana     Comment: teenager    Sexual activity: None   Other Topics Concern    None   Social History Narrative    None     Social Determinants of Health     Financial Resource Strain: Low Risk   "(10/14/2020)    Overall Financial Resource Strain (CARDIA)     Difficulty of Paying Living Expenses: Not hard at all   Food Insecurity: No Food Insecurity (10/14/2020)    Hunger Vital Sign     Worried About Running Out of Food in the Last Year: Never true     Ran Out of Food in the Last Year: Never true   Transportation Needs: No Transportation Needs (10/14/2020)    PRAPARE - Transportation     Lack of Transportation (Medical): No     Lack of Transportation (Non-Medical): No   Physical Activity: Insufficiently Active (10/14/2020)    Exercise Vital Sign     Days of Exercise per Week: 2 days     Minutes of Exercise per Session: 10 min   Stress: No Stress Concern Present (10/14/2020)    [employer] - Occupational Stress Questionnaire     Feeling of Stress : Only a little   Social Connections: Unknown (10/14/2020)    Social Connection and Isolation Panel [NHANES]     Frequency of Communication with Friends and Family: Once a week     Frequency of Social Gatherings with Friends and Family: Once a week     Attends Congregation Services: 1 to 4 times per year     Active Member of Clubs or Organizations: No     Attends Club or Organization Meetings: Never     Marital Status: Not on file   Intimate Partner Violence: Not At Risk (10/14/2020)    Humiliation, Afraid, Rape, and Kick questionnaire     Fear of Current or Ex-Partner: No     Emotionally Abused: No     Physically Abused: No     Sexually Abused: No   Housing Stability: Not on file      Current Medications:     Current Outpatient Medications   Medication Sig Dispense Refill    Turmeric 500 MG CAPS Take by mouth      zinc gluconate 50 mg tablet Take 50 mg by mouth daily       No current facility-administered medications for this visit.      Allergies:     No Known Allergies   Physical Exam:     /82 (BP Location: Left arm, Patient Position: Sitting, Cuff Size: Large)   Pulse 62   Temp 97.6 °F (36.4 °C) (Tympanic)   Resp 14   Ht 6' 2\" " (1.88 m)   Wt 105 kg (232 lb)   SpO2 97%   BMI 29.79 kg/m²     Physical Exam  Vitals and nursing note reviewed.   Constitutional:       General: He is not in acute distress.     Appearance: He is well-developed.   HENT:      Head: Normocephalic and atraumatic.   Eyes:      Conjunctiva/sclera: Conjunctivae normal.   Cardiovascular:      Rate and Rhythm: Normal rate and regular rhythm.      Heart sounds: No murmur heard.  Pulmonary:      Effort: Pulmonary effort is normal. No respiratory distress.      Breath sounds: Normal breath sounds.   Abdominal:      Palpations: Abdomen is soft.      Tenderness: There is no abdominal tenderness.   Musculoskeletal:         General: No swelling.      Cervical back: Neck supple.   Skin:     General: Skin is warm and dry.      Capillary Refill: Capillary refill takes less than 2 seconds.   Neurological:      Mental Status: He is alert.   Psychiatric:         Mood and Affect: Mood normal.        Nutrition Assessment and Intervention:     Reviewed food recall journal      Physical Activity Assessment and Intervention:    Activity journal reviewed      Emotional and Mental Well-being, Sleep, Connectedness Assessment and Intervention:    Sleep/stress assessment performed      Tobacco and Toxic Substance Assessment and Intervention:     Tobacco use screening performed      Therapeutic Lifestyle Change Visit:     One-on-one comprehensive counseling, coaching, and health behavior change visit completed           Bouchra Sapp MD  [facility] INTERNAL MEDICINE [place]

## 2024-02-29 ENCOUNTER — TELEPHONE (OUTPATIENT)
Age: 64
End: 2024-02-29

## 2024-02-29 NOTE — TELEPHONE ENCOUNTER
02/29/24  Screened by: Nicolle Broussard    Referring Provider     Pre- Screening:     There is no height or weight on file to calculate BMI.  Has patient been referred for a routine screening Colonoscopy? no  Is the patient between 45-75 years old? no      Previous Colonoscopy yes   If yes:    Date: 2015    Facility:     Reason:     Does the patient want to see a Gastroenterologist prior to their procedure OR are they having any GI symptoms? no    Has the patient been hospitalized or had abdominal surgery in the past 6 months? no    Does the patient use supplemental oxygen? no    Does the patient take Coumadin, Lovenox, Plavix, Elliquis, Xarelto, or other blood thinning medication? no    Has the patient had a stroke, cardiac event, or stent placed in the past year? no    If patient is between 45yrs - 49yrs, please advise patient that we will have to confirm benefits & coverage with their insurance company for a routine screening colonoscopy.

## 2024-02-29 NOTE — TELEPHONE ENCOUNTER
Scheduled date of colonoscopy (as of today):  4/24/24  Physician performing colonoscopy: JULIA  Location of colonoscopy: [facility]  Bowel prep reviewed with patient: JERICHO / WYATT  Instructions reviewed with patient by: LEONILA  Clearances:  N/A

## 2024-03-01 LAB — HBA1C MFR BLD HPLC: 5.2 %

## 2024-04-10 ENCOUNTER — ANESTHESIA EVENT (OUTPATIENT)
Dept: ANESTHESIOLOGY | Facility: HOSPITAL | Age: 64
End: 2024-04-10

## 2024-04-10 ENCOUNTER — ANESTHESIA (OUTPATIENT)
Dept: ANESTHESIOLOGY | Facility: HOSPITAL | Age: 64
End: 2024-04-10

## 2024-04-19 ENCOUNTER — TELEPHONE (OUTPATIENT)
Dept: GASTROENTEROLOGY | Facility: AMBULARY SURGERY CENTER | Age: 64
End: 2024-04-19

## 2024-07-18 ENCOUNTER — RA CDI HCC (OUTPATIENT)
Dept: OTHER | Facility: HOSPITAL | Age: 64
End: 2024-07-18

## 2024-07-29 ENCOUNTER — TELEPHONE (OUTPATIENT)
Age: 64
End: 2024-07-29

## 2024-07-29 NOTE — TELEPHONE ENCOUNTER
Patients GI provider:  Dr. Lewis    Number to return call: [phone]    Reason for call: Pt calling stating he has a colonoscopy on Wed. 7/31/24 and needs instructions.  Patient's appt. Was scheduled on [place] for an OV with TJ Loja. Explained to patient, he will call back to scheduled.    Scheduled procedure/appointment date if applicable: NA

## 2025-05-27 ENCOUNTER — OFFICE VISIT (OUTPATIENT)
Dept: INTERNAL MEDICINE CLINIC | Facility: CLINIC | Age: 65
End: 2025-05-27
Payer: COMMERCIAL

## 2025-05-27 VITALS
WEIGHT: 212 LBS | OXYGEN SATURATION: 97 % | RESPIRATION RATE: 14 BRPM | BODY MASS INDEX: 27.21 KG/M2 | HEIGHT: 74 IN | DIASTOLIC BLOOD PRESSURE: 70 MMHG | TEMPERATURE: 97.9 F | HEART RATE: 54 BPM | SYSTOLIC BLOOD PRESSURE: 118 MMHG

## 2025-05-27 DIAGNOSIS — Z00.00 ANNUAL PHYSICAL EXAM: ICD-10-CM

## 2025-05-27 DIAGNOSIS — R10.31 RIGHT GROIN PAIN: ICD-10-CM

## 2025-05-27 DIAGNOSIS — G89.29 CHRONIC LEFT-SIDED LOW BACK PAIN WITHOUT SCIATICA: Primary | ICD-10-CM

## 2025-05-27 DIAGNOSIS — M54.50 CHRONIC LEFT-SIDED LOW BACK PAIN WITHOUT SCIATICA: Primary | ICD-10-CM

## 2025-05-27 DIAGNOSIS — Z12.11 COLON CANCER SCREENING: ICD-10-CM

## 2025-05-27 PROBLEM — M54.9 CHRONIC BACK PAIN: Status: ACTIVE | Noted: 2025-05-27

## 2025-05-27 PROCEDURE — 99396 PREV VISIT EST AGE 40-64: CPT

## 2025-05-27 NOTE — PROGRESS NOTES
INTERNAL MEDICINE HEALTH MAINTENANCE OFFICE VISIT  [facility] - [facility] INTERNAL MEDICINE [place]    NAME: Carrington Quintanilla  AGE: 64 y.o. SEX: male  : 1960     DATE: 2025     Assessment and Plan:     1. Chronic left-sided low back pain without sciatica  Assessment & Plan:  Patient presents for annual physical.  His only complaint is in regards to acute on chronic low back pain localized to the sacroiliac joint without radiation of pain or numbness and tingling in the left lower extremity.  He has been taking Aleve as needed two times daily.  Pain has been worsening recently secondary to exertional activity including gardening and movements that require bending over.  Likely attributed to osteoarthritis.    Plan:  Obtain lumbar spine x-ray to rule out alternative etiologies for pain  Advised patient that he can take Tylenol 650 mg two to three times daily as needed for mild pain  Can continue to take Aleve as needed for moderate to severe pain  Ambulatory referral to physical therapy  Orders:  -     Ambulatory Referral to Physical Therapy; Future  -     XR spine lumbar 2 or 3 views injury; Future; Expected date: 2025  2. Right groin pain  Assessment & Plan:  Complaining of right groin pain that started a few weeks ago for which he attributes to physical activity as he states it feels like a pulled muscle.  He denies any radiation of pain from the right lumbar spine and into the right lower extremity.  Pain in the groin can be secondary to hip osteoarthritis.  Will obtain hip and pelvic x-rays to assess for osteoarthritic changes of the right hip  Orders:  -     XR hip/pelv 2-3 vws right if performed; Future; Expected date: 2025  3. Annual physical exam  -     Ambulatory Referral to Physical Therapy; Future  -     CBC and differential; Future; Expected date: Collect anytime  -     Comprehensive metabolic panel; Future; Expected date: Collect anytime  -     Lipid panel; Future;  Expected date: Collect anytime  -     Hemoglobin A1C; Future; Expected date: Collect anytime  -     CBC and differential  -     Comprehensive metabolic panel  -     Lipid panel  4. Colon cancer screening  -     Ambulatory Referral to Gastroenterology; Future      Patient Counseling:  Nutrition: Stressed importance of moderation in sodium/caffeine intake, saturated fat, trans fat and cholesterol. Discussed portion control as well as increasing intake of fruits, vegetables, lean protein, and fish.   Exercise: Stressed the importance of regular exercise at least 150 mins/week  Sexuality: Discussed sexually transmitted diseases, partner selection, use of condoms, avoidance of unintended pregnancy  and contraceptive alternatives.  Injury prevention: Discussed safety belts, safety helmets, smoke detector, smoking near bedding or upholstery.   Dental health: Discussed importance of regular tooth brushing, flossing, and dental visits.  Immunizations reviewed.   Discussed benefits of screening . Patient last had colonoscopy in 2015 that showed three small polyps and internal hemorrhoids. Repeat colonoscopy was recommended in five years.  Education was printed for patient    Discussed the patient's BMI with him.  The BMI is above average; BMI management plan is completed    No follow-ups on file.     Chief Complaint:     Chief Complaint   Patient presents with    Physical Exam        History of Present Illness:     Well Adult Physical   Patient here for a comprehensive physical exam.The patient reports acute on chronic low back pain that has been exacerbated by gardening and other exertional activities requiring bending over. The pain is primarily localized over the sacroiliac joint over the left side. He states the pain at the moment is a 2/10. He is endorsing sciatica-like symptoms with radiation of pain into the right groin rather than the left side. He states he noticed it a few weeks ago and it feels like a pulled muscle  possibly from activity. He does do exercises in the gym with back extensions, which has provided relief. He does occasionally take two tablets of Aleve per day as needed and was previously taking ibuprofen.     Diet and Physical Activity  Diet: low carbohydrate, variant of Mediterranean diet  Weight concerns: Patient is overweight (BMI 25.0-29.9)  Exercise: Goes to the gym every week on the weekends      Depression Screen  Negative for depression with PHQ2 score of 0.     General Health  Hearing: Slighty decreased: left  Vision: wears glasses  Dental: regular dental visits and brushes teeth twice daily    Reproductive Health  No present concerns.    The following portions of the patient's history were reviewed and updated as appropriate: allergies, current medications, past family history, past medical history, past social history, past surgical history and problem list.     Review of Systems:     Review of Systems   Constitutional:  Negative for chills and fever.   HENT:  Negative for ear pain and sore throat.    Eyes:  Negative for pain and visual disturbance.   Respiratory:  Negative for cough and shortness of breath.    Cardiovascular:  Negative for chest pain and palpitations.   Gastrointestinal:  Negative for abdominal pain, constipation, nausea and vomiting.   Genitourinary:  Negative for decreased urine volume, difficulty urinating, dysuria and hematuria.   Musculoskeletal:  Positive for back pain. Negative for arthralgias and myalgias.   Skin:  Negative for color change and rash.   Neurological:  Negative for seizures, syncope and weakness.   Psychiatric/Behavioral:  Negative for agitation and confusion.    All other systems reviewed and are negative.       Past Medical History:     Past Medical History[1]     Past Surgical History:     Past Surgical History[2]     Social History:     Social History[3]     Family History:     Family History[4]     Current Medications:     Medications Prior to Visit[5]       "Allergies:     Allergies[6]     Objective:     Physical Exam:  /70 (BP Location: Left arm, Patient Position: Sitting, Cuff Size: Adult)   Pulse (!) 54   Temp 97.9 °F (36.6 °C) (Tympanic)   Resp 14   Ht 6' 2\" (1.88 m)   Wt 96.2 kg (212 lb)   SpO2 97%   BMI 27.22 kg/m²     General Appearance:    Alert, cooperative, no distress, appears stated age   Head:    Normocephalic, without obvious abnormality, atraumatic   Eyes:    PERRL, conjunctiva/corneas clear, EOM's intact, fundi     benign, both eyes        Ears:    Normal TM's and external ear canals, both ears   Nose:   Nares normal, septum midline, mucosa normal, no drainage    or sinus tenderness   Throat:   Lips, mucosa, and tongue normal; teeth and gums normal   Neck:   Supple, symmetrical, trachea midline, no adenopathy;        thyroid:  No enlargement/tenderness/nodules; no carotid    bruit or JVD   Back:     Symmetric, no curvature, ROM normal, no CVA tenderness   Lungs:     Clear to auscultation bilaterally, respirations unlabored   Chest wall:    No tenderness or deformity   Heart:    Regular rate and rhythm, S1 and S2 normal, no murmur, rub   or gallop   Abdomen:     Soft, non-tender, bowel sounds active all four quadrants,     no masses, no organomegaly   Genitalia:    Normal male without lesion, discharge or tenderness   Rectal:    Normal tone, normal prostate, no masses or tenderness;    guaiac negative stool   Extremities:   Extremities normal, atraumatic, no cyanosis or edema   Pulses:   2+ and symmetric all extremities   Skin:   Skin color, texture, turgor normal, no rashes or lesions   Lymph nodes:   Cervical, supraclavicular, and axillary nodes normal   Neurologic:   CNII-XII intact. Normal strength, sensation and reflexes       throughout       Data:    Laboratory Results: I have personally reviewed the pertinent laboratory results/reports   Radiology/Other Diagnostic Testing Results: Results Review Statement: No pertinent imaging studies " reviewed.     Health Maintenance:     Health Maintenance   Topic Date Due    DTaP,Tdap,and Td Vaccines (1 - Tdap) Never done    Zoster Vaccine (1 of 2) Never done    Colorectal Cancer Screening  2020    COVID-19 Vaccine (1 -  season) Never done    Annual Physical  2025    Influenza Vaccine (Season Ended) 2025    Depression Screening  2026    RSV Vaccine for Pregnant Patients and Patients Age 60+ Years (1 - 1-dose 75+ series) 2037    HIV Screening  Completed    Hepatitis C Screening  Completed    Meningococcal B Vaccine  Aged Out    RSV Vaccine age 0-20 Months  Aged Out    Pneumococcal Vaccine: Pediatrics (0 to 5 Years) and At-Risk Patients (6 to 64 Years)  Aged Out    HIB Vaccine  Aged Out    IPV Vaccine  Aged Out    Hepatitis A Vaccine  Aged Out    Meningococcal ACWY Vaccine  Aged Out    HPV Vaccine  Aged Out    Lung Cancer Screening  Discontinued       There is no immunization history on file for this patient.    Miguel Matta, DO  [facility] INTERNAL MEDICINE [place]            [1]   Past Medical History:  Diagnosis Date    Skin lesion of neck 10/14/2020   [2]   Past Surgical History:  Procedure Laterality Date    TONSILECTOMY AND ADNOIDECTOMY     [3]   Social History  Socioeconomic History    Marital status:    Tobacco Use    Smoking status: Former     Current packs/day: 0.00     Average packs/day: 1 pack/day for 32.0 years (32.0 ttl pk-yrs)     Types: Cigarettes     Start date: 1988     Quit date: 2020     Years since quittin.8    Smokeless tobacco: Never   Vaping Use    Vaping status: Never Used   Substance and Sexual Activity    Alcohol use: Not Currently    Drug use: Yes     Types: Marijuana     Comment: teenager     Social Drivers of Health     Financial Resource Strain: Low Risk  (10/14/2020)    Overall Financial Resource Strain (CARDIA)     Difficulty of Paying Living Expenses: Not hard at all   Food Insecurity: No Food Insecurity (2025)    Hunger  Vital Sign     Worried About Running Out of Food in the Last Year: Never true     Ran Out of Food in the Last Year: Never true   Transportation Needs: No Transportation Needs (5/27/2025)    PRAPARE - Transportation     Lack of Transportation (Medical): No     Lack of Transportation (Non-Medical): No   Physical Activity: Sufficiently Active (5/27/2025)    Exercise Vital Sign     Days of Exercise per Week: 3 days     Minutes of Exercise per Session: 60 min   Stress: No Stress Concern Present (5/27/2025)    [employer] [employer] - Occupational Stress Questionnaire     Feeling of Stress: Not at all   Social Connections: Unknown (10/14/2020)    Social Connection and Isolation Panel     Frequency of Communication with Friends and Family: Once a week     Frequency of Social Gatherings with Friends and Family: Once a week     Attends Voodoo Services: 1 to 4 times per year     Active Member of Clubs or Organizations: No     Attends Club or Organization Meetings: Never   Intimate Partner Violence: Not At Risk (10/14/2020)    Humiliation, Afraid, Rape, and Kick questionnaire     Fear of Current or Ex-Partner: No     Emotionally Abused: No     Physically Abused: No     Sexually Abused: No   Housing Stability: Low Risk  (5/27/2025)    Housing Stability Vital Sign     Unable to Pay for Housing in the Last Year: No     Number of Times Moved in the Last Year: 0     Homeless in the Last Year: No   [4]   Family History  Adopted: Yes   [5]   Outpatient Medications Prior to Visit   Medication Sig Dispense Refill    Turmeric 500 MG CAPS Take by mouth      zinc gluconate 50 mg tablet Take 50 mg by mouth in the morning.       No facility-administered medications prior to visit.   [6] No Known Allergies     [id number]

## 2025-05-27 NOTE — ASSESSMENT & PLAN NOTE
Patient presents for annual physical.  His only complaint is in regards to acute on chronic low back pain localized to the sacroiliac joint without radiation of pain or numbness and tingling in the left lower extremity.  He has been taking Aleve as needed two times daily.  Pain has been worsening recently secondary to exertional activity including gardening and movements that require bending over.  Likely attributed to osteoarthritis.    Plan:  Obtain lumbar spine x-ray to rule out alternative etiologies for pain  Advised patient that he can take Tylenol 650 mg two to three times daily as needed for mild pain  Can continue to take Aleve as needed for moderate to severe pain  Ambulatory referral to physical therapy

## 2025-05-27 NOTE — PATIENT INSTRUCTIONS
Can take Tylenol 650 mg 2-3 times daily as needed for mild pain  Continue to take Aleve as needed for moderate to severe pain  Follow-up with GI for colonoscopy  Ambulatory referral to physical therapy

## 2025-05-27 NOTE — ASSESSMENT & PLAN NOTE
Complaining of right groin pain that started a few weeks ago for which he attributes to physical activity as he states it feels like a pulled muscle.  He denies any radiation of pain from the right lumbar spine and into the right lower extremity.  Pain in the groin can be secondary to hip osteoarthritis.  Will obtain hip and pelvic x-rays to assess for osteoarthritic changes of the right hip

## 2025-05-29 ENCOUNTER — HOSPITAL ENCOUNTER (OUTPATIENT)
Dept: RADIOLOGY | Facility: HOSPITAL | Age: 65
Discharge: HOME | End: 2025-05-29
Payer: COMMERCIAL

## 2025-05-29 DIAGNOSIS — R10.31 RIGHT GROIN PAIN: ICD-10-CM

## 2025-05-29 DIAGNOSIS — G89.29 CHRONIC LEFT-SIDED LOW BACK PAIN WITHOUT SCIATICA: ICD-10-CM

## 2025-05-29 DIAGNOSIS — M54.50 CHRONIC LEFT-SIDED LOW BACK PAIN WITHOUT SCIATICA: ICD-10-CM

## 2025-05-29 PROCEDURE — 73502 X-RAY EXAM HIP UNI 2-3 VIEWS: CPT

## 2025-05-29 PROCEDURE — 72100 X-RAY EXAM L-S SPINE 2/3 VWS: CPT

## 2025-05-30 ENCOUNTER — TELEPHONE (OUTPATIENT)
Age: 65
End: 2025-05-30

## 2025-05-30 ENCOUNTER — PREP FOR PROCEDURE (OUTPATIENT)
Age: 65
End: 2025-05-30

## 2025-05-30 DIAGNOSIS — Z86.0100 HISTORY OF COLON POLYPS: Primary | ICD-10-CM

## 2025-05-30 DIAGNOSIS — Z12.11 SCREENING FOR COLON CANCER: ICD-10-CM

## 2025-05-30 NOTE — TELEPHONE ENCOUNTER
05/30/25  Screened by: Ryan Durham    Referring Provider     Pre- Screening:     There is no height or weight on file to calculate BMI.  Has patient been referred for a routine screening Colonoscopy? yes  Is the patient between 45-75 years old? yes      Previous Colonoscopy yes   If yes:    Date: 2015      Does the patient want to see a Gastroenterologist prior to their procedure OR are they having any GI symptoms? no    Has the patient been hospitalized or had abdominal surgery in the past 6 months? no    Does the patient use supplemental oxygen? no    Does the patient take Coumadin, Lovenox, Plavix, Elliquis, Xarelto, or other blood thinning medication? no    Has the patient had a stroke, cardiac event, or stent placed in the past year? no      If patient is between 45yrs - 49yrs, please advise patient that we will have to confirm benefits & coverage with their insurance company for a routine screening colonoscopy.

## 2025-05-30 NOTE — TELEPHONE ENCOUNTER
Scheduled date of colonoscopy (as of today):07.17.25    Physician performing colonoscopy:Joshua    Location of colonoscopy:[facility]    Bowel prep reviewed with patient:Robert/Shon    Instructions reviewed with patient by:Lsims and sent to chart

## 2025-05-30 NOTE — LETTER
Attached are your prep instructions for your upcoming procedure on (date of procedure). If you have any questions or concerns please contact us at [phone].    Thank you,      [facility]

## 2025-06-02 ENCOUNTER — EVALUATION (OUTPATIENT)
Dept: PHYSICAL THERAPY | Facility: OTHER | Age: 65
End: 2025-06-02
Payer: COMMERCIAL

## 2025-06-02 DIAGNOSIS — M54.50 CHRONIC LEFT-SIDED LOW BACK PAIN WITHOUT SCIATICA: Primary | ICD-10-CM

## 2025-06-02 DIAGNOSIS — G89.29 CHRONIC LEFT-SIDED LOW BACK PAIN WITHOUT SCIATICA: Primary | ICD-10-CM

## 2025-06-02 PROCEDURE — 97110 THERAPEUTIC EXERCISES: CPT

## 2025-06-02 PROCEDURE — 97161 PT EVAL LOW COMPLEX 20 MIN: CPT

## 2025-06-02 NOTE — PROGRESS NOTES
PT Evaluation     Today's date: 2025  Patient name: Carrington Quintanilla  : 1960  MRN: [id number]  Referring provider: Miguel Matta DO  Dx:   Encounter Diagnosis     ICD-10-CM    1. Chronic left-sided low back pain without sciatica  M54.50     G89.29           Start Time: 1645  Stop Time: 1725  Total time in clinic (min): 40 minutes    Assessment  Impairments: abnormal coordination, abnormal muscle firing, abnormal muscle tone, abnormal or restricted ROM, abnormal movement, activity intolerance, impaired balance, impaired physical strength, lacks appropriate home exercise program, pain with function, poor body mechanics, participation limitations, activity limitations and endurance  Symptom irritability: moderate    Assessment details: Problem List/Impairments:  1) Lumbar hypomobility deficits  2) B/L hip musculature hypomobility deficits    Carrington Quintanilla is a pleasant 64 y.o. male who presents with left sided lumbar pain and right sided groin pain. he presents upon exam with problem list/impairments noted above, nociceptive pain type, resulting in the pain he is experiencing, worry over not knowing what's wrong, fear of not being able to keep active, and future ill health (and wanting to prevent it). No further referral appears necessary at this time based upon examination results. I expect he will improve in 10-12 weeks. Positive prognostic indicators include positive attitude toward recovery, good understanding of diagnosis and treatment plan options, absence of peripheralization, and absence of observed red flags. Negative prognostic indicators include chronicity of symptoms and multiple concurrent orthopedic problems. Patient was provided with a customized home exercise program to begin performing on their own. All patient questions and concerns have been addressed at this time. Thank you for the kind referral.  Understanding of Dx/Px/POC: good     Prognosis: good    Goals  Short Term Goals:   1. Patient  will be independent with a customized HEP.  2. Patient will report at least 40% improvement overall with performance of ADL's.  3. Patient will improve pain with activity by 50%.  4. Patient will demonstrate full, pain/symptoms free lumbar AROM all planes.  5. Patient will demonstrate negative nery test, prone knee flexion test, and hamstring 90/90 testing.    Long Term Goals:   1. Patient will improve FOTO to greater than goal.  2. Patient will improve pain with activity to 2/10 or less.  3. Patient will continue with HEP independence to allow for decreased future reoccurrence of pain and loss in function.  4. Patient will report at least 80% improvement overall with performance of ADL's.  5. Patient will return to all gym activities with minimal to no pain or limitations.     Plan  Patient would benefit from: PT eval and skilled physical therapy  Planned modality interventions: cryotherapy, TENS, thermotherapy: hydrocollator packs, low level laser therapy and electrical stimulation/Russian stimulation    Planned therapy interventions: manual therapy, massage, joint mobilization, coordination, graded exercise, graded activity, functional ROM exercises, therapeutic exercise, therapeutic activities, patient education, neuromuscular re-education, home exercise program, flexibility, strengthening, IASTM, kinesiology taping, [place] taping, nerve gliding, balance/weight bearing training, body mechanics training and stretching    Frequency: 1-2x week  Duration in weeks: 12  Plan of Care beginning date: 6/2/2025  Plan of Care expiration date: 8/25/2025  Treatment plan discussed with: patient  Plan details: Prognosis above is given PT services 2x/week tapering to 1x/week over the next 2-3 months and home program adherence.        Subjective Evaluation    History of Present Illness  Mechanism of injury: Patient is a 64 y.o. male presenting to physical therapy with chief complaint of left sided low back pain and right  "sided groin/thigh pain. Patient reports his back pain has been chronic in nature and his right groin began about a couple weeks ago. Patient does report being active with light weight training in the gym.   Quality of life: fair    Patient Goals  Patient goals for therapy: independence with ADLs/IADLs, decreased pain, increased strength, increased motion, return to sport/leisure activities and improved balance  Patient goal: \"I would like to learn how to manage my pain and potentially make it better so I can keep in good health and prevent myself from worsening.\"  Pain  Current pain ratin  At worst pain ratin  Quality: dull ache and tight (\"Buzzing\")  Relieving factors: rest (occasional OTC)  Aggravating factors: sitting, lifting and standing    Social Support    Working: .  Exercise history: light weights, used to do yoga, hiking          Objective    Posture: Slouched seated posture  Palpation: Mild TTP Left Lumbar Paraspinals  Myotomes (L/R): Intact  Dermatome: (pinprick- L/R):  Intact    Lumbar  % of normal   Flex. 75 P   Extn. 75   SG Left 75 P   SG Right 75   ROT Left NT   ROT Right NT   Repeated Movements  Standing:  Extension= Decreased, better     Lying:   Extension= Decreased, better          MMT         AROM        PROM    Hip       L       R          L        R          L       R   Flex. 4 4   WFL WFL   Extn.     0 0   Abd.         Add.         IR.     35 35   ER.     45 45            Knee         Flexion         Extension             Neuro Dynamic Testing:  Straight leg raise:   L= -     R= -      Hip:   scour=  -       vic = -     faddir= -      90/90 Hamstring length= + B/L  prone knee flexion= + B/L                   nery test= + B/L           Segmental mobility:   LS= Hypomobile    TS=  Hypomobile                Precautions: Standard.    POC expires Unit limit Auth Expiration date PT/OT + Visit Limit?   25 BOMN N/A 30 PPY (ends 25)                             Visit 1 " "IE            Manuals 6/2            Lumbar Mobs GJL Gr III            Sidelying Hip flexor stretch GJL                                                    Neuro Re-Ed             Curl Up             Bird Dog             Glute Med Iso                                                                 Ther Ex             Prone Lying in Extension 2' HEP            ERLINDA 2x10 HEP            Hip flexor chair stretch 10x5\" HEP                                                                             Ther Activity                                       Gait Training                                       Modalities                                              "

## 2025-06-05 ENCOUNTER — RESULTS FOLLOW-UP (OUTPATIENT)
Dept: INTERNAL MEDICINE CLINIC | Facility: CLINIC | Age: 65
End: 2025-06-05

## 2025-06-11 ENCOUNTER — OFFICE VISIT (OUTPATIENT)
Dept: PHYSICAL THERAPY | Facility: OTHER | Age: 65
End: 2025-06-11
Payer: COMMERCIAL

## 2025-06-11 DIAGNOSIS — M54.50 CHRONIC LEFT-SIDED LOW BACK PAIN WITHOUT SCIATICA: Primary | ICD-10-CM

## 2025-06-11 DIAGNOSIS — G89.29 CHRONIC LEFT-SIDED LOW BACK PAIN WITHOUT SCIATICA: Primary | ICD-10-CM

## 2025-06-11 PROCEDURE — 97140 MANUAL THERAPY 1/> REGIONS: CPT

## 2025-06-11 PROCEDURE — 97112 NEUROMUSCULAR REEDUCATION: CPT

## 2025-06-11 PROCEDURE — 97110 THERAPEUTIC EXERCISES: CPT

## 2025-06-11 NOTE — PROGRESS NOTES
"Daily Note     Today's date: 2025  Patient name: Carrington Quintanilla  : 1960  MRN: [id number]  Referring provider: Jhonny Zamarripa MD  Dx:   Encounter Diagnosis     ICD-10-CM    1. Chronic left-sided low back pain without sciatica  M54.50     G89.29           Start Time: 1715  Stop Time: 1758  Total time in clinic (min): 43 minutes    Subjective: Patient reports feeling some improvement after last visit.       Objective: See treatment diary below      Assessment: Tolerated treatment well. Patient demonstrated fatigue post treatment, exhibited good technique with therapeutic exercises, and would benefit from continued PT.       Plan: Continue per plan of care.      Precautions: Standard.    POC expires Unit limit Auth Expiration date PT/OT + Visit Limit?   25 BOMN ($65 co-pay) N/A 30 PPY (ends 25)                             Visit 1 IE 2           Manuals            Lumbar Mobs GJL Gr III GJL Gr III           Sidelying Hip flexor stretch GJL  GJL                                                  Neuro Re-Ed             Curl Up  5x5\" ea           Bird Dog  10x5\" ea           Glute Med Iso             Beach [place] Press  12# 10x5\"           SLS   Airex BP distractors 2x30\" ea                                     Ther Ex             Prone Lying in Extension 2' HEP 2'           ERLINDA 2x10 HEP            Hip flexor chair stretch 10x5\" HEP 5x10\" ea                                                                            Ther Activity                                       Gait Training                                       Modalities                                                   "

## 2025-07-03 ENCOUNTER — ANESTHESIA EVENT (OUTPATIENT)
Dept: ANESTHESIOLOGY | Facility: HOSPITAL | Age: 65
End: 2025-07-03

## 2025-07-03 ENCOUNTER — ANESTHESIA (OUTPATIENT)
Dept: ANESTHESIOLOGY | Facility: HOSPITAL | Age: 65
End: 2025-07-03